# Patient Record
Sex: MALE | Race: WHITE | NOT HISPANIC OR LATINO | Employment: FULL TIME | URBAN - METROPOLITAN AREA
[De-identification: names, ages, dates, MRNs, and addresses within clinical notes are randomized per-mention and may not be internally consistent; named-entity substitution may affect disease eponyms.]

---

## 2017-08-21 ENCOUNTER — APPOINTMENT (OUTPATIENT)
Dept: GENERAL RADIOLOGY | Facility: HOSPITAL | Age: 27
End: 2017-08-21

## 2017-08-21 ENCOUNTER — HOSPITAL ENCOUNTER (EMERGENCY)
Facility: HOSPITAL | Age: 27
Discharge: HOME | End: 2017-08-21
Attending: EMERGENCY MEDICINE | Admitting: EMERGENCY MEDICINE

## 2017-08-21 VITALS
HEIGHT: 74 IN | BODY MASS INDEX: 28.23 KG/M2 | TEMPERATURE: 98.6 F | RESPIRATION RATE: 16 BRPM | HEART RATE: 81 BPM | WEIGHT: 220 LBS | SYSTOLIC BLOOD PRESSURE: 135 MMHG | DIASTOLIC BLOOD PRESSURE: 70 MMHG | OXYGEN SATURATION: 96 %

## 2017-08-21 DIAGNOSIS — R06.4 HYPERVENTILATION: ICD-10-CM

## 2017-08-21 DIAGNOSIS — J98.01 BRONCHOSPASM, ACUTE: ICD-10-CM

## 2017-08-21 DIAGNOSIS — R06.02 SHORTNESS OF BREATH: Primary | ICD-10-CM

## 2017-08-21 PROCEDURE — 99283 EMERGENCY DEPT VISIT LOW MDM: CPT

## 2017-08-21 PROCEDURE — 63710000001 PREDNISONE PER 1 MG: Performed by: EMERGENCY MEDICINE

## 2017-08-21 PROCEDURE — 93005 ELECTROCARDIOGRAM TRACING: CPT | Performed by: EMERGENCY MEDICINE

## 2017-08-21 PROCEDURE — 94640 AIRWAY INHALATION TREATMENT: CPT

## 2017-08-21 PROCEDURE — 71020 HC CHEST PA AND LATERAL: CPT

## 2017-08-21 PROCEDURE — 93010 ELECTROCARDIOGRAM REPORT: CPT | Performed by: INTERNAL MEDICINE

## 2017-08-21 RX ORDER — LORAZEPAM 1 MG/1
1 TABLET ORAL ONCE
Status: COMPLETED | OUTPATIENT
Start: 2017-08-21 | End: 2017-08-21

## 2017-08-21 RX ORDER — PREDNISONE 20 MG/1
60 TABLET ORAL DAILY
Qty: 9 TABLET | Refills: 0 | Status: SHIPPED | OUTPATIENT
Start: 2017-08-21

## 2017-08-21 RX ORDER — ALBUTEROL SULFATE 90 UG/1
2 AEROSOL, METERED RESPIRATORY (INHALATION) EVERY 6 HOURS PRN
Qty: 18 G | Refills: 0 | Status: SHIPPED | OUTPATIENT
Start: 2017-08-21

## 2017-08-21 RX ORDER — PREDNISONE 20 MG/1
60 TABLET ORAL ONCE
Status: COMPLETED | OUTPATIENT
Start: 2017-08-21 | End: 2017-08-21

## 2017-08-21 RX ORDER — IPRATROPIUM BROMIDE AND ALBUTEROL SULFATE 2.5; .5 MG/3ML; MG/3ML
3 SOLUTION RESPIRATORY (INHALATION) ONCE
Status: COMPLETED | OUTPATIENT
Start: 2017-08-21 | End: 2017-08-21

## 2017-08-21 RX ORDER — CELECOXIB 50 MG/1
50 CAPSULE ORAL 2 TIMES DAILY
COMMUNITY

## 2017-08-21 RX ORDER — SODIUM PHOSPHATE,MONO-DIBASIC 19G-7G/118
ENEMA (ML) RECTAL
COMMUNITY

## 2017-08-21 RX ADMIN — PREDNISONE 60 MG: 20 TABLET ORAL at 17:19

## 2017-08-21 RX ADMIN — IPRATROPIUM BROMIDE AND ALBUTEROL SULFATE 3 ML: .5; 3 SOLUTION RESPIRATORY (INHALATION) at 17:23

## 2017-08-21 RX ADMIN — LORAZEPAM 1 MG: 1 TABLET ORAL at 17:19

## 2017-08-21 NOTE — DISCHARGE INSTRUCTIONS
Take medications as prescribed.  Follow-up with your doctor next several days.  Return to emergency department for worsening shortness of breath, fever, dizziness, palpitations, or other concern.

## 2018-08-10 ENCOUNTER — OCCUPATIONAL MEDICINE (OUTPATIENT)
Dept: URGENT CARE | Facility: PHYSICIAN GROUP | Age: 28
End: 2018-08-10
Payer: COMMERCIAL

## 2018-08-10 DIAGNOSIS — S29.019A THORACIC MYOFASCIAL STRAIN, INITIAL ENCOUNTER: ICD-10-CM

## 2018-08-10 PROCEDURE — 99204 OFFICE O/P NEW MOD 45 MIN: CPT | Mod: 29 | Performed by: PHYSICIAN ASSISTANT

## 2018-08-10 RX ORDER — CYCLOBENZAPRINE HCL 10 MG
10 TABLET ORAL 3 TIMES DAILY PRN
Qty: 30 TAB | Refills: 0 | Status: SHIPPED | OUTPATIENT
Start: 2018-08-10 | End: 2022-11-07

## 2018-08-10 ASSESSMENT — ENCOUNTER SYMPTOMS
CHILLS: 0
MYALGIAS: 1
HEADACHES: 0
BACK PAIN: 1
DIZZINESS: 0
FEVER: 0
COUGH: 0
SHORTNESS OF BREATH: 0
TINGLING: 0
HEMOPTYSIS: 0
FOCAL WEAKNESS: 0
SENSORY CHANGE: 0
PALPITATIONS: 0

## 2018-08-10 NOTE — LETTER
[address] [place] [address]  Phone:  [phone] - Fax:  [phone]   [facility] Progress Report and Disability Certification  Date of Service: 8/10/2018   No Show:  No  Date / Time of Next Visit:   Discharged- not work related injury   Claim Information   Patient Name: Kendell Medina  Claim Number:     Employer: [employer]  Date of Injury: 8/9/2018     Insurer / TPA: [employer]  ID / SSN:     Occupation: maintenance teck  Diagnosis: The encounter diagnosis was Thoracic myofascial strain, initial encounter.    Medical Information   Related to Industrial Injury? No  Comments:no mechanism of injury to indicate a work-related injury    Subjective Complaints:  Date of onset of pain- 8/9/18. Patient reports he was walking while at work and felt a sharp pain in his left shoulder blade. The patient was not doing any heavy lifting or bending at the time. He was simply walking and felt pain. He states the pain was so intense he had to sit down. His pain has somewhat improved today. He does not feel this is a work related injury as he was not performing any actions when the pain occurred. He denies chest pain, shortness of breath, tingling, or numbness. He has no swelling. He denies difficulty with movement. Patient has taken OTC NSAIDS with significant relief.   Objective Findings: Vitals reviewed.  Heart: RRR. No Murmur, rub or gallop.  Lungs: CTA. No respiratory distress.   Left shoulder: NTTP. FROM. Distal n/v intact.  strength 5/5.  Thoracic spine: NTTP. Left paraspinal muscle TTP. Pain just medial to left shoulder blade.    Pre-Existing Condition(s): none   Assessment:   Initial Visit    Status: Discharged / Care Transfer  Permanent Disability:No    Plan: MedicationMedication (NOT at Work)  Comments:OTC NSAIDS, Flexeril (NOT at work). RICE, heat.    Diagnostics:      Comments:       Disability Information   Status: Released to  Full Duty    From:  8/10/2018  Through:   Restrictions are:     Physical Restrictions   Sitting:    Standing:    Stooping:    Bending:      Squatting:    Walking:    Climbing:    Pushing:      Pulling:    Other:    Reaching Above Shoulder (L):   Reaching Above Shoulder (R):       Reaching Below Shoulder (L):    Reaching Below Shoulder (R):      Not to exceed Weight Limits   Carrying(hrs):   Weight Limit(lb):   Lifting(hrs):   Weight  Limit(lb):     Comments: Follow-up with PCP if symptoms persist. Not a work related injury.    Repetitive Actions   Hands: i.e. Fine Manipulations from Grasping:     Feet: i.e. Operating Foot Controls:     Driving / Operate Machinery:     Physician Name: Obdulio Hester P.A.-C. Physician Signature: OBDULIO Linton P.A.-C. e-Signature: Dr. Rocael Guzman, Medical Director   Clinic Name / Location: [address] [address] Clinic Phone Number: Dept: [phone]   Appointment Time: 9:30 Am Visit Start Time:    Check-In Time:  9:34 Am Visit Discharge Time:  9:56am   Original-Treating Physician or Chiropractor    Page 2-Insurer/TPA    Page 3-Employer    Page 4-Employee

## 2018-08-10 NOTE — LETTER
August 10, 2018         Patient: Kendell Medina   YOB: 1990   Date of Visit: 8/10/2018           To Whom it May Concern:    Kendell Medina was seen in my clinic on 8/10/2018. He is able to return to work on 8/10/18.    If you have any questions or concerns, please don't hesitate to call.        Sincerely,           Edna Hester P.A.-C.  Electronically Signed

## 2018-08-10 NOTE — LETTER
EMPLOYEE’S CLAIM FOR COMPENSATION/ REPORT OF INITIAL TREATMENT  FORM C-4    EMPLOYEE’S CLAIM - PROVIDE ALL INFORMATION REQUESTED   First Name  Kendell Last Name  [place] Birthdate                    1990                Sex  male Claim Number   Home Address  [address] Age  28 y.o. 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 (if applicable)  walking    How did this injury or occupational disease occur? (Be specific an answer in detail. Use additional sheet if necessary)  Don't know   If you believe that you have an occupational disease, when did you first have knowledge of the disability and it relationship to your employment?  no Witnesses to the Accident  no      Nature of Injury or Occupational Disease  Workers' Compensation  Part(s) of Body Injured or Affected  Shoulder (L), ,     I certify that the above is true and correct to the best of my knowledge and that I have provided this information in order to obtain the benefits of [place] inclusive or Chapter [place] of [place]).  I hereby authorize any physician,  chiropractor, surgeon, practitioner, or other person, any hospital, including [facility] or [facility], any medical service organization, any insurance company, or other institution or organization to release to each other, any medical or other information, including benefits paid or payable, pertinent to this injury or disease, except information relative to diagnosis, treatment and/or counseling for AIDS, psychological conditions, alcohol or controlled substances, for which I must give specific authorization.  A Photostat of this authorization shall be as valid as the original.     Date 08/10/2018   [facility]   Employee’s Signature   THIS REPORT MUST BE COMPLETED AND MAILED WITHIN 3 WORKING DAYS OF TREATMENT   [facility]  Name of Facility  [place]   Date  8/10/2018 Diagnosis  (S29.019A) Thoracic myofascial strain, initial encounter Is there evidence the injured employee was under the influence of alcohol and/or another controlled substance at the time of accident?   Hour   Description of Injury or Disease  The encounter diagnosis was Thoracic myofascial strain, initial encounter. No   Treatment  OTC NSAIDS. Flexeril (not at work). Sedation side effects discussed. No Driving or alcohol with medication given.  Follow-up with PCP if symptoms persist.  Have you advised the patient to remain off work five days or more? No   X-Ray Findings      If Yes   From Date  To Date      From information given by the employee, together with medical evidence, can you directly connect this injury or occupational disease as job incurred?  No If No Full Duty  Yes Modified Duty      Is additional medical care by a physician indicated?  No If Modified Duty, Specify any Limitations / Restrictions      Do you know of any previous injury or disease contributing to this condition or occupational disease?                            No   Date  8/10/2018 Print Doctor’s Name Edna ZARATE  "LACEY Hester I certify the employer’s copy of  this form was mailed on:   Address  [address] Insurer’s Use Only     [facility] Zip  [address]    Provider’s Tax ID Number  [id number] Telephone  Dept: [phone]        jason-OBDULIO Galvez P.A.-C.   e-Signature: Dr. Rocael Guzman, Medical Director Degree  TAWANDA        ORIGINAL-TREATING PHYSICIAN OR CHIROPRACTOR    PAGE 2-INSURER/TPA    PAGE 3-EMPLOYER    PAGE 4-EMPLOYEE             Form C-4 (rev10/07)              BRIEF DESCRIPTION OF RIGHTS AND BENEFITS  (Pursuant to NRS 616C.050)    Notice of Injury or Occupational Disease (Incident Report Form C-1): If an injury or occupational disease (OD) arises out of and in the  course of employment, you must provide written notice to your employer as soon as practicable, but no later than 7 days after the accident or  OD. Your employer shall maintain a sufficient supply of the required forms.    Claim for Compensation (Form C-4): If medical treatment is sought, the form C-4 is available at the place of initial treatment. A completed  \"Claim for Compensation\" (Form C-4) must be filed within 90 days after an accident or OD. The treating physician or chiropractor must,  within 3 working days after treatment, complete and mail to the employer, the employer's insurer and third-party , the Claim for  Compensation.    Medical Treatment: If you require medical treatment for your on-the-job injury or OD, you may be required to select a physician or  chiropractor from a list provided by your workers’ compensation insurer, if it has contracted with an Organization for Managed Care (MCO) or  Preferred Provider Organization (PPO) or providers of health care. If your employer has not entered into a contract with an MCO or PPO, you  may select a physician or chiropractor from the Panel of Physicians and Chiropractors. Any medical costs related to your industrial injury or  OD will be paid by " your insurer.    Temporary Total Disability (TTD): If your doctor has certified that you are unable to work for a period of at least 5 consecutive days, or 5  cumulative days in a 20-day period, or places restrictions on you that your employer does not accommodate, you may be entitled to TTD  compensation.    Temporary Partial Disability (TPD): If the wage you receive upon reemployment is less than the compensation for TTD to which you are  entitled, the insurer may be required to pay you TPD compensation to make up the difference. TPD can only be paid for a maximum of 24  months.    Permanent Partial Disability (PPD): When your medical condition is stable and there is an indication of a PPD as a result of your injury or  OD, within 30 days, your insurer must arrange for an evaluation by a rating physician or chiropractor to determine the degree of your PPD. The  amount of your PPD award depends on the date of injury, the results of the PPD evaluation and your age and wage.    Permanent Total Disability (PTD): If you are medically certified by a treating physician or chiropractor as permanently and totally disabled  and have been granted a PTD status by your insurer, you are entitled to receive monthly benefits not to exceed 66 2/3% of your average  monthly wage. The amount of your PTD payments is subject to reduction if you previously received a PPD award.    Vocational Rehabilitation Services: You may be eligible for vocational rehabilitation services if you are unable to return to the job due to a  permanent physical impairment or permanent restrictions as a result of your injury or occupational disease.    Transportation and [place] Reimbursement: You may be eligible for travel expenses and [place] associated with medical treatment.    Reopening: You may be able to reopen your claim if your condition worsens after claim closure.    Appeal Process: If you disagree with a written determination issued by the  insurer or the insurer does not respond to your request, you may  appeal to the Department of Administration, , by following the instructions contained in your determination letter. You must  appeal the determination within 70 days from the date of the determination letter at [address], [address], [place], [place]  [address], or [address], [address], [place], [place] [address]. If you disagree with the  decision, you may appeal to the  Department of Administration, . You must file your appeal within 30 days from the date of the  decision  letter at [address], [address], [place], [place] [address], or [address], [place], [place] [address]. If you  disagree with a decision of an , you may file a petition for judicial review with the District Court. You must do so within 30  days of the Appeal Officer’s decision. You may be represented by an  at your own expense or you may contact the [facility] for possible  representation.    [place]  for Injured Workers (NAIW): If you disagree with a  decision, you may request that NAIW represent you  without charge at an  Hearing. For information regarding denial of benefits, you may contact the [facility] at: [address], [address], [place] [address], [phone], or [address], [place] [address], [phone]    To File a Complaint with the Division: If you wish to file a complaint with the  of the Division of Industrial Relations (DIR),  please contact the Workers’ Compensation Section, [address], [address], [place], [place] [address], telephone [phone]) [phone], or  [address], [place] [address], telephone [phone].    For assistance with Workers’ Compensation Issues: you may contact the Office of the Governor  Consumer Health Assistance, [address], [address], [place], [place] [address], Toll Free 1-911.579.1734, Web site: http://govcha.[facility].nv., E-mail  [email or web address].[email or web address].                                                                                                                                                                                                                                   __________________________________________________________________                                                                   __08/10/2018_______________                Employee Name / Signature                                                                                                                                                       Date                                                                                                                                                                                                     D-2 (rev. 10/07)

## 2018-08-10 NOTE — PROGRESS NOTES
Subjective:      Kendell Medina is a 28 y.o. male who presents with No chief complaint on file.      Date of onset of pain- 8/9/18. Patient reports he was walking while at work and felt a sharp pain in his left shoulder blade. The patient was not doing any heavy lifting or bending at the time. He was simply walking and felt pain. He states the pain was so intense he had to sit down. His pain has somewhat improved today. He does not feel this is a work related injury as he was not performing any actions when the pain occurred. He denies chest pain, shortness of breath, tingling, or numbness. He has no swelling. He denies difficulty with movement. Patient has taken OTC NSAIDS with significant relief.     HPI    No past medical history on file.    No past surgical history on file.    No family history on file.    Allergies   Allergen Reactions   • Penicillins Hives   • Sulfa Drugs Hives       Medications, Allergies, and current problem list reviewed today in Epic    Review of Systems   Constitutional: Negative for chills, fever and malaise/fatigue.   Respiratory: Negative for cough, hemoptysis and shortness of breath.    Cardiovascular: Negative for chest pain, palpitations and leg swelling.   Musculoskeletal: Positive for back pain and myalgias.   Skin: Negative for rash.   Neurological: Negative for dizziness, tingling, sensory change, focal weakness and headaches.     All other systems reviewed and are negative.          Objective:     There were no vitals taken for this visit.     Physical Exam   Constitutional: He is oriented to person, place, and time. He appears well-developed and well-nourished. No distress.   Neurological: He is alert and oriented to person, place, and time.   Skin: Skin is warm and dry.   Psychiatric: He has a normal mood and affect. His behavior is normal. Judgment and thought content normal.       Vitals reviewed.  Heart: RRR. No Murmur, rub or gallop.  Lungs: CTA. No respiratory  distress.   Left shoulder: NTTP. FROM. Distal n/v intact.  strength 5/5.  Thoracic spine: NTTP. Left paraspinal muscle TTP. Pain just medial to left shoulder blade.        Assessment/Plan:     1. Thoracic myofascial strain, initial encounter    - Not work related  - OTC NSAIDS  - cyclobenzaprine (FLEXERIL) 10 MG Tab; Take 1 Tab by mouth 3 times a day as needed.  Dispense: 30 Tab; Refill: 0  Sedation side effects discussed. No Driving or alcohol with medication given.    Follow-up with PCP or Urgent Care if symptoms persist.    Work note given for patient to return to work tonight.     Differential diagnoses, Supportive care, and indications for immediate follow-up discussed with patient.   Instructed to return to clinic or nearest emergency department for any change in condition, further concerns, or worsening of symptoms.    The patient demonstrated a good understanding and agreed with the treatment plan.    Edna Hester P.A.-C.

## 2021-07-14 ENCOUNTER — NON-PROVIDER VISIT (OUTPATIENT)
Dept: URGENT CARE | Facility: PHYSICIAN GROUP | Age: 31
End: 2021-07-14

## 2021-07-14 DIAGNOSIS — Z02.1 PRE-EMPLOYMENT DRUG SCREENING: ICD-10-CM

## 2021-07-14 LAB
AMP AMPHETAMINE: NORMAL
COC COCAINE: NORMAL
INT CON NEG: NORMAL
INT CON POS: NORMAL
MET METHAMPHETAMINES: NORMAL
OPI OPIATES: NORMAL
PCP PHENCYCLIDINE: NORMAL
POC DRUG COMMENT 753798-OCCUPATIONAL HEALTH: NEGATIVE
THC: NORMAL

## 2021-07-14 PROCEDURE — 80305 DRUG TEST PRSMV DIR OPT OBS: CPT | Performed by: PHYSICIAN ASSISTANT

## 2022-11-07 ENCOUNTER — OFFICE VISIT (OUTPATIENT)
Dept: URGENT CARE | Facility: PHYSICIAN GROUP | Age: 32
End: 2022-11-07
Payer: COMMERCIAL

## 2022-11-07 VITALS
DIASTOLIC BLOOD PRESSURE: 68 MMHG | WEIGHT: 150 LBS | HEIGHT: 73 IN | RESPIRATION RATE: 16 BRPM | OXYGEN SATURATION: 100 % | SYSTOLIC BLOOD PRESSURE: 138 MMHG | TEMPERATURE: 96.9 F | HEART RATE: 79 BPM | BODY MASS INDEX: 19.88 KG/M2

## 2022-11-07 DIAGNOSIS — R40.20 LOSS OF CONSCIOUSNESS (HCC): ICD-10-CM

## 2022-11-07 DIAGNOSIS — R56.9 SEIZURE (HCC): ICD-10-CM

## 2022-11-07 PROCEDURE — 99205 OFFICE O/P NEW HI 60 MIN: CPT

## 2022-11-07 ASSESSMENT — ENCOUNTER SYMPTOMS
WEAKNESS: 1
DIAPHORESIS: 1

## 2022-11-07 NOTE — PROGRESS NOTES
"Subjective     Kendell Medina is a 32 y.o. male who presents with Seizure (/20-30 minutes ago, did not want  to call ambulance. Stopped breathing. )            Seizure  This is a new problem. The current episode started today. The problem has been resolved. Associated symptoms include diaphoresis and weakness. Nothing aggravates the symptoms. He has tried nothing for the symptoms.     Patient presents with symptoms that started 40 minutes prior to consult.  His wife reports that she was driving when the patient suddenly had an episode.  She reports the episode lasted for about 10 minutes, and the \"seizure\" for 3 minutes.  The patient states that he started feeling tingling sensation from his toes and fingers that eventually involved his whole body.  He experienced diaphoresis, states he felt sweaty.  His wife states that she had to stop on the side of the freeway when he started \"passing out\".  She reports that he lost consciousness for about 10 minutes.  She notes that when he was seizing, his eyes were open.  She further notes his breathing to be shallow at that point.  She states trying to call 911 through [employer], but was unsuccessful.  Patient denies any previous history of seizures.  He denies any history of head trauma.  He denies drinking alcohol.  He reports previous history of heavy drinking for 4 years, but states that he has not had alcohol in the past 2 months.  He denies any history of DTs.  He also reports previous drug use, but denies any current utilization.    Patient's current problem list, medications, and past medical/surgical history were reviewed in Epic.    PMH:  has no past medical history on file.  MEDS:   Current Outpatient Medications:     ibuprofen (MOTRIN) 200 MG Tab, Take 1 Tablet by mouth every 6 hours as needed., Disp: , Rfl:   ALLERGIES:   Allergies   Allergen Reactions    Penicillins Hives    Sulfa Drugs Hives     SURGHX: No past surgical history on file.  SOCHX:  reports " "that he has been smoking cigarettes. He has been smoking an average of 1 pack per day. He has never used smokeless tobacco. He reports that he does not drink alcohol and does not use drugs.  FH: Reviewed with patient, not pertinent to this visit.        Review of Systems   Constitutional:  Positive for diaphoresis.   Neurological:  Positive for weakness.            Objective     /68   Pulse 79   Temp 36.1 °C (96.9 °F) (Temporal)   Resp 16   Ht 1.854 m (6' 1\")   Wt 68 kg (150 lb)   SpO2 100%   BMI 19.79 kg/m²      Physical Exam  Constitutional:       Appearance: Normal appearance.   HENT:      Head: Normocephalic.      Nose: Nose normal.   Eyes:      Extraocular Movements: Extraocular movements intact.   Cardiovascular:      Rate and Rhythm: Normal rate and regular rhythm.      Pulses: Normal pulses.      Heart sounds: Normal heart sounds.   Pulmonary:      Effort: Pulmonary effort is normal.      Breath sounds: Normal breath sounds.   Musculoskeletal:         General: Normal range of motion.      Cervical back: Normal range of motion.   Skin:     General: Skin is warm.   Neurological:      General: No focal deficit present.      Mental Status: He is alert and oriented to person, place, and time.      Cranial Nerves: No dysarthria.      Sensory: Sensation is intact.      Motor: Motor function is intact.      Comments: Unable to complete gait assessment as patient is weak from the event and high risk for fall    Psychiatric:         Mood and Affect: Mood normal.         Behavior: Behavior normal.                   Assessment & Plan        1. Seizure (HCC)      2. Loss of consciousness (HCC)    Patient's physical exam is unremarkable.  He appeared weak and exhausted, but otherwise vitals are unremarkable.  His neurologic exam is mostly unremarkable, but were unable to assess his gait as he is unsteady and weak.  He is advised to go to the emergency room for further evaluation and management.  Offered to " call [phone] patient can be transported via ambulance, but but wife refused this and states that she will drive her to the ER via private vehicle.  Advised her that if patient started seizing again, to stop the car and call 911.  Discussed treatment plan with patient, he is agreeable and verbalized understanding.  Educated patient on signs and symptoms watch out for, when to return to the clinic or go to the ER.    Electronically Signed by RENNY Wilson

## 2023-07-18 ENCOUNTER — OFFICE VISIT (OUTPATIENT)
Dept: URGENT CARE | Facility: PHYSICIAN GROUP | Age: 33
End: 2023-07-18
Payer: COMMERCIAL

## 2023-07-18 VITALS
OXYGEN SATURATION: 98 % | SYSTOLIC BLOOD PRESSURE: 132 MMHG | WEIGHT: 189 LBS | TEMPERATURE: 98.4 F | HEIGHT: 73 IN | DIASTOLIC BLOOD PRESSURE: 72 MMHG | RESPIRATION RATE: 16 BRPM | BODY MASS INDEX: 25.05 KG/M2 | HEART RATE: 85 BPM

## 2023-07-18 DIAGNOSIS — K52.9 GASTROENTERITIS: ICD-10-CM

## 2023-07-18 PROCEDURE — 3078F DIAST BP <80 MM HG: CPT | Performed by: FAMILY MEDICINE

## 2023-07-18 PROCEDURE — 99213 OFFICE O/P EST LOW 20 MIN: CPT | Performed by: FAMILY MEDICINE

## 2023-07-18 PROCEDURE — 3075F SYST BP GE 130 - 139MM HG: CPT | Performed by: FAMILY MEDICINE

## 2023-07-18 NOTE — PROGRESS NOTES
"  Subjective:      33 y.o. male presents to urgent care for GI issues that started yesterday.  He woke up and had sudden onset vomiting and diarrhea.  There was no blood in either vomit or stool.  He did have associated abdominal pain.  Today he has had no vomiting, diarrhea, or abdominal pain.  No changes to urinary urgency, frequency, dysuria, or hematuria.  No recent travel, antibiotic use, change in diet, or exposure to exotic animals.  No prior history of abdominal surgery.    He denies any other questions or concerns at this time.    Current problem list, medication, and past medical/surgical history were reviewed in Epic.    ROS  See HPI     Objective:      /72   Pulse 85   Temp 36.9 °C (98.4 °F) (Temporal)   Resp 16   Ht 1.854 m (6' 1\")   Wt 85.7 kg (189 lb)   SpO2 98%   BMI 24.94 kg/m²     Physical Exam  Constitutional:       General: He is not in acute distress.     Appearance: He is not diaphoretic.   Cardiovascular:      Rate and Rhythm: Normal rate and regular rhythm.      Heart sounds: Normal heart sounds.   Pulmonary:      Effort: Pulmonary effort is normal. No respiratory distress.      Breath sounds: Normal breath sounds.   Abdominal:      General: Bowel sounds are normal.      Palpations: Abdomen is soft.      Tenderness: There is no abdominal tenderness. There is no right CVA tenderness or left CVA tenderness.   Neurological:      Mental Status: He is alert.   Psychiatric:         Mood and Affect: Affect normal.         Judgment: Judgment normal.       Assessment/Plan:     1. Gastroenteritis  No red flag warning signs.  Symptoms are already improving.  Most consistent with virus.  He is encouraged to stay well-hydrated.      Instructed to return to Urgent Care or nearest Emergency Department if symptoms fail to improve, for any change in condition, further concerns, or new concerning symptoms. Patient states understanding of the plan of care and discharge instructions.    Tasha LARKIN" GAUTAM Parker.

## 2023-07-18 NOTE — LETTER
July 18, 2023    To Whom It May Concern:         This is confirmation that Kendell Carol attended his scheduled appointment with Tasha Parker M.D. on 7/18/23. He may return to work tomorrow without any restrictions.          If you have any questions please do not hesitate to call me at the phone number listed below.    Sincerely,          Tasha Parker M.D.  [phone]

## 2024-06-09 ENCOUNTER — OFFICE VISIT (OUTPATIENT)
Dept: URGENT CARE | Facility: PHYSICIAN GROUP | Age: 34
End: 2024-06-09
Payer: COMMERCIAL

## 2024-06-09 VITALS
SYSTOLIC BLOOD PRESSURE: 108 MMHG | TEMPERATURE: 98.1 F | OXYGEN SATURATION: 99 % | BODY MASS INDEX: 25.02 KG/M2 | DIASTOLIC BLOOD PRESSURE: 64 MMHG | HEIGHT: 73 IN | WEIGHT: 188.8 LBS | HEART RATE: 67 BPM | RESPIRATION RATE: 16 BRPM

## 2024-06-09 DIAGNOSIS — M62.830 MUSCLE SPASM OF BACK: ICD-10-CM

## 2024-06-09 PROCEDURE — 3078F DIAST BP <80 MM HG: CPT | Performed by: FAMILY MEDICINE

## 2024-06-09 PROCEDURE — 99213 OFFICE O/P EST LOW 20 MIN: CPT | Performed by: FAMILY MEDICINE

## 2024-06-09 PROCEDURE — 3074F SYST BP LT 130 MM HG: CPT | Performed by: FAMILY MEDICINE

## 2024-06-09 RX ORDER — SERTRALINE HYDROCHLORIDE 25 MG/1
TABLET, FILM COATED ORAL
COMMUNITY
Start: 2024-05-22 | End: 2025-05-23

## 2024-06-09 RX ORDER — CYCLOBENZAPRINE HCL 10 MG
TABLET ORAL
Qty: 30 TABLET | Refills: 0 | Status: SHIPPED | OUTPATIENT
Start: 2024-06-09

## 2024-06-09 RX ORDER — NICOTINE 21 MG/24HR
PATCH, TRANSDERMAL 24 HOURS TRANSDERMAL
COMMUNITY
Start: 2024-05-23 | End: 2024-06-22

## 2024-06-09 NOTE — PROGRESS NOTES
Chief Complaint:    Chief Complaint   Patient presents with    Low Back Pain     X this am with spasms radiating to L side. Hx of Chronic back pain.        History of Present Illness:    Was driving to work today and left mid-lower back started with spasms. He has similar occurrences, about once a year for many years. Helpful for previous episodes has been Flexeril and a day off work to rest. Does not need anything else for this. No recent injury or trauma. No radiating pain down legs. No loss of bowel/bladder control. No saddle anesthesia.      Past Medical History:    No past medical history on file.    Past Surgical History:    No past surgical history on file.    Social History:    Social History     Socioeconomic History    Marital status:      Spouse name: Not on file    Number of children: Not on file    Years of education: Not on file    Highest education level: Not on file   Occupational History    Not on file   Tobacco Use    Smoking status: Every Day     Current packs/day: 1.00     Types: Cigarettes    Smokeless tobacco: Never   Substance and Sexual Activity    Alcohol use: No    Drug use: No    Sexual activity: Not on file   Other Topics Concern    Not on file   Social History Narrative    Not on file     Social Determinants of Health     Financial Resource Strain: Not on file   Food Insecurity: Not on file   Transportation Needs: Not on file   Physical Activity: Not on file   Stress: Not on file   Social Connections: Not on file   Intimate Partner Violence: Not on file   Housing Stability: Not on file     Family History:    No family history on file.    Medications:    Current Outpatient Medications on File Prior to Visit   Medication Sig Dispense Refill    sertraline (ZOLOFT) 25 MG tablet TAKE ONE TABLET BY MOUTH ONCE A DAY FOR DEPRESSION **MEDICATION GUIDE**      nicotine (NICODERM) 21 MG/24HR PATCH 24 HR APPLY 1 PATCH TO SKIN ONCE A DAY TO ASSIST WITH SMOKING CESSATION. STOP TOBACCO PRODUCTS  "UPON STARTING THIS MEDICATION REMOVE OLD PATCH PRIOR TO APPLYING NEW PATCH      nicotine polacrilex (NICORETTE) 4 MG gum CHEW 1 PIECE BY MOUTH EVERY ONE TO TWO HOURS AS NEEDED TO ASSIST WITH SMOKING CESSATION. STOP TOBACCO PRODUCTS UPON STARTING THIS MEDICATION. MAXIMUM 24 PIECES/DAY      ibuprofen (MOTRIN) 200 MG Tab Take 1 Tablet by mouth every 6 hours as needed.       No current facility-administered medications on file prior to visit.     Allergies:    Allergies   Allergen Reactions    Penicillins Hives    Sulfa Drugs Hives       Vitals:    Vitals:    24 1200   BP: 108/64   Pulse: 67   Resp: 16   Temp: 36.7 °C (98.1 °F)   TempSrc: Temporal   SpO2: 99%   Weight: 85.6 kg (188 lb 12.8 oz)   Height: 1.854 m (6' 1\")       Physical Exam:    Constitutional: Vital signs reviewed. Appears well-developed and well-nourished. Has pain due to back.   Eyes: Sclera white, conjunctivae clear.  ENT: External ears normal. Hearing normal.  Pulmonary/Chest: Respirations non-labored.  Musculoskeletal: Left mid and lower back paraspinal muscles are tender to palpation and tighter compared to right side.  Neurological: Alert and oriented to person, place, and time. Muscle tone normal. Coordination normal. Light touch and sensation normal.   Skin: No rashes or lesions. Warm, dry, normal turgor.  Psychiatric: Normal mood and affect. Behavior is normal. Judgment and thought content normal.       Assessment / Plan & Medical Decision Makin. Muscle spasm of back  - cyclobenzaprine (FLEXERIL) 10 mg Tab; 1 TAB BY MOUTH EVERY 8 HOURS ONLY IF NEEDED FOR PAIN, MUSCLE SPASM, AND/OR MUSCLE TIGHTNESS. MAY CAUSE DROWSINESS.  Dispense: 30 Tablet; Refill: 0       Work note given - excuse for 24.    Discussed with him DDX, management options, and risks, benefits, and alternatives to treatment plan agreed upon.    Was driving to work today and left mid-lower back started with spasms. He has similar occurrences, about once a year for " many years. Helpful for previous episodes has been Flexeril and a day off work to rest. Does not need anything else for this. No recent injury or trauma. No radiating pain down legs. No loss of bowel/bladder control. No saddle anesthesia.    Has pain due to back. Left mid and lower back paraspinal muscles are tender to palpation and tighter compared to right side.    Agreeable to medication prescribed.    Discussed expected course of duration, time for improvement, and to seek follow-up in Emergency Room, urgent care, or with PCP if getting worse at any time or not improving within expected time frame.

## 2024-06-09 NOTE — LETTER
June 9, 2024         Patient: Kendell Medina   YOB: 1990   Date of Visit: 6/9/2024           To Whom it May Concern:    Kendell Medina was seen in my clinic on 6/9/2024.     Please excuse from work for 6/9/24 due to medical condition.     If you have any questions or concerns, please don't hesitate to call.        Sincerely,           Guerrero Fong M.D.  Electronically Signed

## 2024-06-10 ENCOUNTER — OFFICE VISIT (OUTPATIENT)
Dept: URGENT CARE | Facility: PHYSICIAN GROUP | Age: 34
End: 2024-06-10
Payer: COMMERCIAL

## 2024-06-10 VITALS
RESPIRATION RATE: 16 BRPM | WEIGHT: 183 LBS | TEMPERATURE: 98.5 F | DIASTOLIC BLOOD PRESSURE: 74 MMHG | OXYGEN SATURATION: 98 % | HEIGHT: 73 IN | BODY MASS INDEX: 24.25 KG/M2 | HEART RATE: 88 BPM | SYSTOLIC BLOOD PRESSURE: 110 MMHG

## 2024-06-10 DIAGNOSIS — S39.012D ACUTE MYOFASCIAL STRAIN OF LUMBAR REGION, SUBSEQUENT ENCOUNTER: ICD-10-CM

## 2024-06-10 PROCEDURE — 99212 OFFICE O/P EST SF 10 MIN: CPT

## 2024-06-10 PROCEDURE — 3078F DIAST BP <80 MM HG: CPT

## 2024-06-10 PROCEDURE — 3074F SYST BP LT 130 MM HG: CPT

## 2024-06-10 ASSESSMENT — ENCOUNTER SYMPTOMS: BACK PAIN: 1

## 2024-06-10 NOTE — PROGRESS NOTES
Subjective:   Kendell Medina is a very pleasant 34 y.o. male who presents for:    Chief Complaint   Patient presents with    Low Back Pain     With spasms.  Pt. Was seen yesterday and needs a note to stay off work today.        HPI:    The patient reports that he was seen in the urgent care yesterday for the evaluation of a lumbar strain.  He is supposed to work today and tomorrow and is requesting a work note.  Reports that he is engaging in supportive measures as well as taking cyclobenzaprine as needed.  No complaints of lower extremity weakness, loss of bladder or bowel, worsening pain.    ROS:    Review of Systems   Musculoskeletal:  Positive for back pain.   All other systems reviewed and are negative.      Medications:      Current Outpatient Medications   Medication Sig    sertraline (ZOLOFT) 25 MG tablet TAKE ONE TABLET BY MOUTH ONCE A DAY FOR DEPRESSION **MEDICATION GUIDE**    nicotine (NICODERM) 21 MG/24HR PATCH 24 HR APPLY 1 PATCH TO SKIN ONCE A DAY TO ASSIST WITH SMOKING CESSATION. STOP TOBACCO PRODUCTS UPON STARTING THIS MEDICATION REMOVE OLD PATCH PRIOR TO APPLYING NEW PATCH    nicotine polacrilex (NICORETTE) 4 MG gum CHEW 1 PIECE BY MOUTH EVERY ONE TO TWO HOURS AS NEEDED TO ASSIST WITH SMOKING CESSATION. STOP TOBACCO PRODUCTS UPON STARTING THIS MEDICATION. MAXIMUM 24 PIECES/DAY    cyclobenzaprine (FLEXERIL) 10 mg Tab 1 TAB BY MOUTH EVERY 8 HOURS ONLY IF NEEDED FOR PAIN, MUSCLE SPASM, AND/OR MUSCLE TIGHTNESS. MAY CAUSE DROWSINESS.    ibuprofen (MOTRIN) 200 MG Tab Take 1 Tablet by mouth every 6 hours as needed.       Allergies:     Allergies   Allergen Reactions    Penicillins Hives    Sulfa Drugs Hives       Problem List:     There is no problem list on file for this patient.      Surgical History:    No past surgical history on file.    Past Social Hx:     Social History     Socioeconomic History    Marital status:    Tobacco Use    Smoking status: Every Day     Current packs/day: 1.00      Types: Cigarettes    Smokeless tobacco: Never   Substance and Sexual Activity    Alcohol use: No    Drug use: No        Past Family Hx:      History reviewed. No pertinent family history.    Problem list, medications, and allergies reviewed by myself today in Epic.     Objective:     Vitals:    06/10/24 0948   BP: 110/74   Pulse: 88   Resp: 16   Temp: 36.9 °C (98.5 °F)   SpO2: 98%       Physical Exam  Vitals reviewed.   Constitutional:       General: He is not in acute distress.     Appearance: He is normal weight. He is not ill-appearing or toxic-appearing.   Musculoskeletal:      Lumbar back: Spasms and tenderness present.        Back:    Neurological:      Mental Status: He is alert.                 Assessment/Plan:     Diagnosis and associated orders:     1. Acute myofascial strain of lumbar region, subsequent encounter          Comments/MDM:     Work note provided for patient  Avoid bending, stooping, heavy or repetitive lifting until symptoms resolve.  Avoid prolonged sitting; frequent changes of position may be helpful.  Begin gentle stretching before activity when tolerated.   OTC NSAIDs for pain/discomfort  Alternate ice and heat to the affected area for symptomatic relief  Home stretches as discussed in clinic  Follow-up with PCP  Return to clinic or go to the ED if symptoms worsen or fail to improve, or if the patient should develop worsening/increasing back pain, radiation of pain, numbness, tingling, or weakness to the lower extremities, incontinence of bladder/bowel, paresthesias, difficulty walking, fever/chills, and/or any concerning symptoms.    Return to clinic as needed         All questions answered. Patient verbalized understanding and is in agreement with this plan of care.     If symptoms are worsening or not improving in 3-5 days, follow-up with PCP or return to . Differential diagnosis, natural history, and supportive care discussed. AVS handout given and reviewed with patient. Patient  educated on red flags and when to seek treatment back in ED or UC.     I personally reviewed prior external notes and test results pertinent to today's visit.  I have independently reviewed and interpreted all diagnostics ordered during this urgent care visit.     This dictation has been created using voice recognition software. The accuracy of the dictation is limited by the abilities of the software. I expect there may be some errors of grammar and possibly content. I made every attempt to manually correct the errors within my dictation. However, errors related to voice recognition software may still exist and should be interpreted within the appropriate context.    This note was electronically signed by WILLIS Alberto

## 2024-06-10 NOTE — LETTER
Funmi 10, 2024    To Whom It May Concern:         This is confirmation that Kendell Coronadford attended his scheduled appointment with WILLIS Solano on 6/10/24. Please excuse him from work from 6/10/24 to 6/11/24 due to an acute condition.         If you have any questions please do not hesitate to call me at the phone number listed below.    Sincerely,          Anabelle Jin A.P.R.N.  [phone]

## 2024-06-16 ENCOUNTER — OFFICE VISIT (OUTPATIENT)
Dept: URGENT CARE | Facility: PHYSICIAN GROUP | Age: 34
End: 2024-06-16
Payer: COMMERCIAL

## 2024-06-16 VITALS
HEART RATE: 66 BPM | OXYGEN SATURATION: 98 % | TEMPERATURE: 98.4 F | RESPIRATION RATE: 16 BRPM | WEIGHT: 180.8 LBS | SYSTOLIC BLOOD PRESSURE: 118 MMHG | HEIGHT: 74 IN | BODY MASS INDEX: 23.2 KG/M2 | DIASTOLIC BLOOD PRESSURE: 80 MMHG

## 2024-06-16 DIAGNOSIS — M54.50 LUMBAR PAIN: ICD-10-CM

## 2024-06-16 PROCEDURE — 99213 OFFICE O/P EST LOW 20 MIN: CPT | Performed by: FAMILY MEDICINE

## 2024-06-16 PROCEDURE — 3074F SYST BP LT 130 MM HG: CPT | Performed by: FAMILY MEDICINE

## 2024-06-16 PROCEDURE — 3079F DIAST BP 80-89 MM HG: CPT | Performed by: FAMILY MEDICINE

## 2024-06-16 RX ORDER — PREDNISONE 20 MG/1
TABLET ORAL
Qty: 11 TABLET | Refills: 0 | Status: SHIPPED | OUTPATIENT
Start: 2024-06-16

## 2024-06-16 ASSESSMENT — ENCOUNTER SYMPTOMS
SENSORY CHANGE: 0
BACK PAIN: 1
FEVER: 0
TINGLING: 0

## 2024-06-16 NOTE — PROGRESS NOTES
Subjective:     Kendell Medina is a 34 y.o. male who presents for Back Pain (Pt states he is still having extreme back pain. He woke up this morning stiff and by the time he got to work he couldn't stay. 7/10 pain currently.)      HPI  Pt presents for repeat evaluation  Patient with acute exacerbation of chronic back pain which started while driving 6/9/2024  He was initially evaluated same day that back pain started in urgent care and started on cyclobenzaprine  Was seen again in the urgent care the next day on 6/10/2024 and was again reviewed supportive care measures and given a note for work  Pain is mostly left lower   No radiation down the leg   No numbness or tingling     Review of Systems   Constitutional:  Negative for fever.   Musculoskeletal:  Positive for back pain.   Neurological:  Negative for tingling and sensory change.     PMH:  has no past medical history on file.  MEDS:   Current Outpatient Medications:     predniSONE (DELTASONE) 20 MG Tab, Take 2 tabs (40mg) daily x 3 days, then take 1 tab (20mg) daily x 3 days, then take 1/2 tab (10mg) daily x 4 days, Disp: 11 Tablet, Rfl: 0    sertraline (ZOLOFT) 25 MG tablet, TAKE ONE TABLET BY MOUTH ONCE A DAY FOR DEPRESSION **MEDICATION GUIDE**, Disp: , Rfl:     nicotine (NICODERM) 21 MG/24HR PATCH 24 HR, APPLY 1 PATCH TO SKIN ONCE A DAY TO ASSIST WITH SMOKING CESSATION. STOP TOBACCO PRODUCTS UPON STARTING THIS MEDICATION REMOVE OLD PATCH PRIOR TO APPLYING NEW PATCH, Disp: , Rfl:     nicotine polacrilex (NICORETTE) 4 MG gum, CHEW 1 PIECE BY MOUTH EVERY ONE TO TWO HOURS AS NEEDED TO ASSIST WITH SMOKING CESSATION. STOP TOBACCO PRODUCTS UPON STARTING THIS MEDICATION. MAXIMUM 24 PIECES/DAY, Disp: , Rfl:     cyclobenzaprine (FLEXERIL) 10 mg Tab, 1 TAB BY MOUTH EVERY 8 HOURS ONLY IF NEEDED FOR PAIN, MUSCLE SPASM, AND/OR MUSCLE TIGHTNESS. MAY CAUSE DROWSINESS., Disp: 30 Tablet, Rfl: 0  ALLERGIES:   Allergies   Allergen Reactions    Penicillins Hives    Sulfa Drugs  "Hives     SURGHX: No past surgical history on file.  SOCHX:  reports that he has been smoking cigarettes. He has never used smokeless tobacco. He reports that he does not drink alcohol and does not use drugs.     Objective:   /80 (BP Location: Left arm, Patient Position: Sitting, BP Cuff Size: Adult)   Pulse 66   Temp 36.9 °C (98.4 °F) (Temporal)   Resp 16   Ht 1.88 m (6' 2\")   Wt 82 kg (180 lb 12.8 oz)   SpO2 98%   BMI 23.21 kg/m²     Physical Exam  Constitutional:       General: He is not in acute distress.     Appearance: He is well-developed. He is not diaphoretic.   Pulmonary:      Effort: Pulmonary effort is normal.   Neurological:      Mental Status: He is alert.     Back:  General: No asymmetry, bruising, or erythema appreciated  ROM: flexion 80°, extension 0°, lateral bend 20°, lateral twist 20°  Palpation: No tenderness to palpation of spinous processes, no step-offs appreciated, +TTP and tightness of the left lumbar paraspinal muscles with some mild tenderness in the right lumbar paraspinal muscles.  No significant SI joint tenderness or buttock tenderness    Assessment/Plan:   Assessment    1. Lumbar pain  - predniSONE (DELTASONE) 20 MG Tab; Take 2 tabs (40mg) daily x 3 days, then take 1 tab (20mg) daily x 3 days, then take 1/2 tab (10mg) daily x 4 days  Dispense: 11 Tablet; Refill: 0    Patient with acute on chronic low back pain.  Did not have any major fall or injury which started this particular episode.  Advised that imaging is not needed today, but could be considered due to his lack of resolution.  [facility] he does most of his long-term care.  [facility] his [facility] and would recommend allowing the [facility] to [facility] since [facility] is [facility] his [facility] care is.  Patient agreeable to plan and will defer imaging today.  Recommended heat, light stretching, activity modification, and " continued light exercise.  Note for work given.  Reviewed risks and  benefits of trial of course of steroids and patient wishing to try the medication.  [facility].

## 2024-06-16 NOTE — LETTER
June 16, 2024    To Whom It May Concern:         This is confirmation that Kendell Medina attended his scheduled appointment with Rodger Jimenez M.D. on 6/16/24.  He is recovering from an acute injury.  He will need restrictions for the next 1 week (until 6/24/24).  He cannot lift over 20 lbs, no climbing ladders, and no twisting with the back.  Please limit bending at the waist to no more than 2 hours per day.  Thank you for making accommodations as he recovers.          If you have any questions please do not hesitate to call me at the phone number listed below.    Sincerely,          Rodger Jimenez M.D.  [phone]

## 2024-06-24 ENCOUNTER — OFFICE VISIT (OUTPATIENT)
Dept: URGENT CARE | Facility: PHYSICIAN GROUP | Age: 34
End: 2024-06-24
Payer: COMMERCIAL

## 2024-06-24 VITALS
DIASTOLIC BLOOD PRESSURE: 72 MMHG | BODY MASS INDEX: 23.33 KG/M2 | HEART RATE: 98 BPM | OXYGEN SATURATION: 95 % | RESPIRATION RATE: 16 BRPM | TEMPERATURE: 98.5 F | HEIGHT: 73 IN | WEIGHT: 176 LBS | SYSTOLIC BLOOD PRESSURE: 124 MMHG

## 2024-06-24 DIAGNOSIS — M54.50 LUMBAR PAIN: ICD-10-CM

## 2024-06-24 DIAGNOSIS — M62.830 MUSCLE SPASM OF BACK: ICD-10-CM

## 2024-06-24 PROCEDURE — 3074F SYST BP LT 130 MM HG: CPT

## 2024-06-24 PROCEDURE — 99214 OFFICE O/P EST MOD 30 MIN: CPT

## 2024-06-24 PROCEDURE — 3078F DIAST BP <80 MM HG: CPT

## 2024-06-24 ASSESSMENT — ENCOUNTER SYMPTOMS
FEVER: 0
BACK PAIN: 1
CHILLS: 0

## 2024-06-24 NOTE — PROGRESS NOTES
"  CHIEF COMPLAINT  Chief Complaint   Patient presents with    Low Back Pain     X this morning after cleaning his yard.      Subjective:   Kendell Medina is a 34 y.o. male who presents to urgent care with complaints of pain x 1 day.  Patient reports symptoms began yesterday evening after cleaning his yard.  He reports  chronic issues with back pain, and has been seen in urgent care intermittently for this condition.  He reports symptoms have improved with dose of Flexeril that he took yesterday evening. He denies any fevers chills.  He denies any radiation of pain.  No numbness or tingling. [facility]. No other pertinent past medical history.     Review of Systems   Constitutional:  Negative for chills and fever.   Musculoskeletal:  Positive for back pain.       PAST MEDICAL HISTORY  There are no problems to display for this patient.      SURGICAL HISTORY  patient denies any surgical history    ALLERGIES  Allergies   Allergen Reactions    Penicillins Hives    Sulfa Drugs Hives       CURRENT MEDICATIONS  Home Medications       Reviewed by [facility] [employer] (Medical Assistant) on 06/24/24 at 1208  Med List Status: <None>     Medication Last Dose Status   cyclobenzaprine (FLEXERIL) 10 mg Tab PRN Active   predniSONE (DELTASONE) 20 MG Tab Not Taking Active   sertraline (ZOLOFT) 25 MG tablet Taking Active                    SOCIAL HISTORY  Social History     Tobacco Use    Smoking status: Every Day     Current packs/day: 1.00     Types: Cigarettes    Smokeless tobacco: Never   Substance and Sexual Activity    Alcohol use: No    Drug use: No    Sexual activity: Not on file       FAMILY HISTORY  No family history on file.      Medications, Allergies, and current problem list reviewed today in Epic.     Objective:     /72   Pulse 98   Temp 36.9 °C (98.5 °F) (Temporal)   Resp 16   Ht 1.854 m (6' 1\")   Wt 79.8 kg (176 lb)   SpO2 95%     Physical " Exam  Vitals reviewed.   Constitutional:       General: He is not in acute distress.     Appearance: Normal appearance. He is normal weight. He is not ill-appearing or toxic-appearing.   HENT:      Head: Normocephalic.      Right Ear: Tympanic membrane normal.      Left Ear: Tympanic membrane normal.      Nose: Nose normal.      Mouth/Throat:      Mouth: Mucous membranes are moist.      Pharynx: Oropharynx is clear. No oropharyngeal exudate or posterior oropharyngeal erythema.   Cardiovascular:      Rate and Rhythm: Normal rate and regular rhythm.      Pulses: Normal pulses.      Heart sounds: Normal heart sounds.   Pulmonary:      Effort: Pulmonary effort is normal. No respiratory distress.      Breath sounds: Normal breath sounds. No stridor. No wheezing, rhonchi or rales.   Musculoskeletal:        Arms:       Cervical back: Normal range of motion and neck supple. No rigidity.      Comments: Back: Full range of flexion, extension, rotation, lateralization.   Tenderness to palpation to bilateral lumbar paraspinal.  Negative midline tenderness, bony tenderness, crepitus, deformities, or step-offs.  Negative straight leg raise    Lymphadenopathy:      Cervical: No cervical adenopathy.   Skin:     General: Skin is warm.      Capillary Refill: Capillary refill takes less than 2 seconds.   Neurological:      General: No focal deficit present.      Mental Status: He is alert.   Psychiatric:         Mood and Affect: Mood normal.         Assessment/Plan:     Diagnosis and associated orders:     1. Lumbar pain        2. Muscle spasm of back           Comments/MDM:     Discussed with patient signs and symptoms consistent with a back strain.   Treatment of initial rest with no heavy lifting, stooping, or strenuous activity. Massage, ice and/or heat which ever feels better, and Ibuprofen per manufacture's instructions. Encouraged walking, stretching, and range of motion exercises as tolerated. Avoid sitting or laying down for  long periods of time except for at night during sleep.   Patient is overall very well-appearing, no acute distress, and normal vital signs. Suspicions for acute emergent pathology are low.   Red flag signs and symptoms discussed.  Instructed to return to ER or urgent care if symptoms worsen or fail to improve.          Differential diagnosis, natural history, supportive care, and indications for immediate follow-up discussed.    Advised the patient to follow-up with the primary care physician for recheck, reevaluation, and consideration of further management.    Please note that this dictation was created using voice recognition software. I have made a reasonable attempt to correct obvious errors, but I expect that there are errors of grammar and possibly content that I did not discover before finalizing the note.    This note was electronically signed by WILLIS Clement

## 2024-06-24 NOTE — LETTER
June 24, 2024    To Whom It May Concern:         This is confirmation that Kendell Coronadford attended his scheduled appointment with WILLIS Clement on 6/24/24. May return to work tomorrow on 6/25/24. Thank you for making accommodations for rest and recovery.          If you have any questions please do not hesitate to call me at the phone number listed below.    Sincerely,          KELLEY Clement.  [phone]

## 2024-08-28 ENCOUNTER — OFFICE VISIT (OUTPATIENT)
Dept: URGENT CARE | Facility: PHYSICIAN GROUP | Age: 34
End: 2024-08-28
Payer: COMMERCIAL

## 2024-08-28 VITALS
HEIGHT: 73 IN | SYSTOLIC BLOOD PRESSURE: 130 MMHG | WEIGHT: 174 LBS | TEMPERATURE: 98.3 F | BODY MASS INDEX: 23.06 KG/M2 | HEART RATE: 99 BPM | RESPIRATION RATE: 16 BRPM | OXYGEN SATURATION: 98 % | DIASTOLIC BLOOD PRESSURE: 80 MMHG

## 2024-08-28 DIAGNOSIS — U07.1 COVID: ICD-10-CM

## 2024-08-28 LAB
FLUAV RNA SPEC QL NAA+PROBE: NEGATIVE
FLUBV RNA SPEC QL NAA+PROBE: NEGATIVE
RSV RNA SPEC QL NAA+PROBE: NEGATIVE
SARS-COV-2 RNA RESP QL NAA+PROBE: POSITIVE

## 2024-08-28 PROCEDURE — 3075F SYST BP GE 130 - 139MM HG: CPT | Performed by: FAMILY MEDICINE

## 2024-08-28 PROCEDURE — 3079F DIAST BP 80-89 MM HG: CPT | Performed by: FAMILY MEDICINE

## 2024-08-28 PROCEDURE — 99214 OFFICE O/P EST MOD 30 MIN: CPT | Performed by: FAMILY MEDICINE

## 2024-08-28 PROCEDURE — 0241U POCT CEPHEID COV-2, FLU A/B, RSV - PCR: CPT | Performed by: FAMILY MEDICINE

## 2024-08-28 ASSESSMENT — ENCOUNTER SYMPTOMS
HEMOPTYSIS: 0
VOMITING: 0
SINUS PAIN: 0
MYALGIAS: 1
FEVER: 1
DIAPHORESIS: 1
DIARRHEA: 1
WHEEZING: 0
SHORTNESS OF BREATH: 0
NAUSEA: 0
COUGH: 1
CHILLS: 1
PALPITATIONS: 0
ABDOMINAL PAIN: 0
SORE THROAT: 0

## 2024-08-28 NOTE — LETTER
August 28, 2024         Patient: Kendell Medina   YOB: 1990   Date of Visit: 8/28/2024           To Whom it May Concern:    Kendell Medina was seen in my clinic on 8/28/2024.      Please excuse recent absence due to illness.       If you have any questions or concerns, please don't hesitate to call.        Sincerely,           Xavi Bloom M.D.  Electronically Signed

## 2024-08-28 NOTE — PROGRESS NOTES
"Subjective     Kendell Medina is a 34 y.o. male who presents with URI (Yesterday: Body aches, headaches, runny nose, loose stools, fatigue, chills)            34-year-old male presents with body aches, subjective fever, chills, fatigue, headache, rhinorrhea, cough, and diarrhea that began suddenly yesterday morning. He states he was at work doing fine one minute and then the next felt like he had been run over. Denies sore throat, SOB, wheezing, dysphagia, abdominal pain, nausea, vomiting. He needs a work note.        Review of Systems   Constitutional:  Positive for chills, diaphoresis, fever and malaise/fatigue.   HENT:  Positive for congestion. Negative for ear discharge, ear pain, sinus pain and sore throat.    Respiratory:  Positive for cough. Negative for hemoptysis, shortness of breath and wheezing.    Cardiovascular:  Negative for chest pain and palpitations.   Gastrointestinal:  Positive for diarrhea. Negative for abdominal pain, nausea and vomiting.   Musculoskeletal:  Positive for myalgias.   Skin:  Negative for rash.              Objective     /80   Pulse 99   Temp 36.8 °C (98.3 °F)   Resp 16   Ht 1.854 m (6' 1\")   Wt 78.9 kg (174 lb)   SpO2 98%   BMI 22.96 kg/m²      Physical Exam  Constitutional:       Appearance: Normal appearance.   HENT:      Head: Normocephalic and atraumatic.      Comments: Left TM is clear. Mildly erythematous right TM. No effusions or bulging. Landmarks visible. Light reflex present.      Right Ear: Ear canal normal.      Left Ear: Ear canal normal.      Mouth/Throat:      Mouth: Mucous membranes are moist.      Pharynx: Posterior oropharyngeal erythema and postnasal drip present. No oropharyngeal exudate.      Tonsils: No tonsillar exudate.   Cardiovascular:      Rate and Rhythm: Normal rate and regular rhythm.   Pulmonary:      Effort: Pulmonary effort is normal.      Breath sounds: Normal breath sounds.   Musculoskeletal:      Cervical back: Neck supple. "   Lymphadenopathy:      Cervical: No cervical adenopathy.   Neurological:      General: No focal deficit present.      Mental Status: He is alert.   Psychiatric:         Mood and Affect: Mood normal.         Behavior: Behavior normal.                   Assessment & Plan   34-year-old otherwise healthy male presents to  for evaluation of sudden onset body aches, fevers, chills, and headaches that started yesterday morning. Patient is non-toxic appearing and his vitals are stable.      PCR positive for COVID    Rx paxlovid      Differential diagnosis, natural history, supportive care, and indications for immediate follow-up discussed. All questions answered. Patient agrees with the plan of care.     Follow-up as needed if symptoms worsen or fail to improve to PCP, Urgent care or Emergency Room.     I have personally reviewed prior external notes and test results pertinent to today's visit.  I have independently reviewed and interpreted all diagnostics ordered during this urgent care acute visit.        Assessment & Plan

## 2025-03-31 ENCOUNTER — OFFICE VISIT (OUTPATIENT)
Dept: URGENT CARE | Facility: PHYSICIAN GROUP | Age: 35
End: 2025-03-31
Payer: COMMERCIAL

## 2025-03-31 VITALS
OXYGEN SATURATION: 98 % | BODY MASS INDEX: 23.99 KG/M2 | WEIGHT: 181 LBS | HEART RATE: 70 BPM | DIASTOLIC BLOOD PRESSURE: 72 MMHG | TEMPERATURE: 97.2 F | HEIGHT: 73 IN | RESPIRATION RATE: 14 BRPM | SYSTOLIC BLOOD PRESSURE: 128 MMHG

## 2025-03-31 DIAGNOSIS — S39.012D ACUTE MYOFASCIAL STRAIN OF LUMBAR REGION, SUBSEQUENT ENCOUNTER: ICD-10-CM

## 2025-03-31 NOTE — PROGRESS NOTES
"    Back Pain        He has hx chronic low back pain from workplace accident several years ago.           C/o worsening back pain x 3 d.       Denies acute injury.      The problem occurs constantly. The problem is unchanged. The pain is present in the lumbar spine. The quality of the pain is described as aching. The pain does not radiate. The pain is moderate. The symptoms are aggravated by position. Pertinent negatives include no bladder incontinence, bowel incontinence, dysuria, fever, headaches, numbness or weakness. Treatments tried: motrin.  The treatment provided no relief.     Social History     Tobacco Use    Smoking status: Every Day     Current packs/day: 1.00     Types: Cigarettes    Smokeless tobacco: Never   Substance Use Topics    Alcohol use: No    Drug use: No       Family hx was reviewed - no pertinent past family hx      Past medical history was unremarkable and not pertinent to current issue    Review of Systems   Constitutional: Negative for fever.   Gastrointestinal: Negative for bowel incontinence.   Genitourinary: Negative for bladder incontinence, dysuria, urgency and frequency.   Musculoskeletal: Positive for back pain.   Neurological: Negative for weakness, numbness and headaches.   All other systems reviewed and are negative.         Objective:     /72   Pulse 70   Temp 36.2 °C (97.2 °F) (Temporal)   Resp 14   Ht 1.854 m (6' 1\")   Wt 82.1 kg (181 lb)   SpO2 98%     Physical Exam   Constitutional: pt is oriented to person, place, and time. Pt appears well-developed and well-nourished. No distress.   HENT:   Head: Normocephalic and atraumatic.   Eyes: EOM are normal. Pupils are equal, round, and reactive to light. No scleral icterus.   Neck: Normal range of motion. Neck supple. No thyromegaly present.   Cardiovascular: Normal rate, regular rhythm and normal heart sounds.    Pulmonary/Chest: Effort normal and breath sounds normal. No respiratory distress.  no wheezes.   no rales. "  no tenderness.   Musculoskeletal:                Lumbar spine: pt exhibits decreased range of motion, spasm and tenderness . Pt exhibits no bony tenderness, no swelling, no edema, no deformity  .   Neurological: patient is alert and oriented to person, place, and time. Patient has normal reflexes. No cranial nerve deficit. Patient exhibits normal muscle tone.      STRAIGHT LEG RAISE, GUANACO NEGATIVE BILAT  Skin: Skin is warm and dry. No rash noted. No erythema.   Psychiatric: patient has a normal mood and affect.  behavior is normal.   Nursing note and vitals reviewed.              Assessment/Plan:          1. Acute myofascial strain of lumbar region, subsequent encounter    rx motrin 800mg tid prn      Differential diagnosis, natural history, supportive care, and indications for immediate follow-up discussed. All questions answered. Patient agrees with the plan of care.     Follow-up as needed if symptoms worsen or fail to improve to PCP, Urgent care or Emergency Room.     I have personally reviewed prior external notes and test results pertinent to today's visit.  I have independently reviewed and interpreted all diagnostics ordered during this urgent care acute visit.

## 2025-03-31 NOTE — LETTER
March 31, 2025         Patient: Kendell Medina   YOB: 1990   Date of Visit: 3/31/2025           To Whom it May Concern:    Kendell Medina was seen in my clinic on 3/31/2025.     Please excuse absence due to illness for 3/30, 31, 4/1.    If you have any questions or concerns, please don't hesitate to call.        Sincerely,           Xavi Bloom M.D.  Electronically Signed

## 2025-06-13 ENCOUNTER — HOSPITAL ENCOUNTER (EMERGENCY)
Facility: MEDICAL CENTER | Age: 35
End: 2025-06-14
Attending: EMERGENCY MEDICINE
Payer: COMMERCIAL

## 2025-06-13 ENCOUNTER — APPOINTMENT (OUTPATIENT)
Dept: RADIOLOGY | Facility: MEDICAL CENTER | Age: 35
End: 2025-06-13
Attending: EMERGENCY MEDICINE
Payer: COMMERCIAL

## 2025-06-13 DIAGNOSIS — S06.0XAA CONCUSSION WITH UNKNOWN LOSS OF CONSCIOUSNESS STATUS, INITIAL ENCOUNTER: Primary | ICD-10-CM

## 2025-06-13 DIAGNOSIS — S01.01XA LACERATION OF SCALP, INITIAL ENCOUNTER: ICD-10-CM
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
## 2025-06-13 PROCEDURE — 304217 HCHG IRRIGATION SYSTEM

## 2025-06-13 PROCEDURE — 304999 HCHG REPAIR-SIMPLE/INTERMED LEVEL 1

## 2025-06-13 PROCEDURE — 99284 EMERGENCY DEPT VISIT MOD MDM: CPT

## 2025-06-13 PROCEDURE — 305308 HCHG STAPLER,SKIN,DISP.

## 2025-06-14 VITALS
SYSTOLIC BLOOD PRESSURE: 120 MMHG | HEIGHT: 73 IN | TEMPERATURE: 98 F | RESPIRATION RATE: 16 BRPM | BODY MASS INDEX: 23.19 KG/M2 | HEART RATE: 78 BPM | WEIGHT: 175 LBS | OXYGEN SATURATION: 98 % | DIASTOLIC BLOOD PRESSURE: 72 MMHG

## 2025-06-14 PROCEDURE — 700102 HCHG RX REV CODE 250 W/ 637 OVERRIDE(OP): Performed by: EMERGENCY MEDICINE

## 2025-06-14 PROCEDURE — 70450 CT HEAD/BRAIN W/O DYE: CPT

## 2025-06-14 PROCEDURE — A9270 NON-COVERED ITEM OR SERVICE: HCPCS | Performed by: EMERGENCY MEDICINE

## 2025-06-14 RX ORDER — ACETAMINOPHEN 500 MG
1000 TABLET ORAL ONCE
Status: COMPLETED | OUTPATIENT
Start: 2025-06-14 | End: 2025-06-14

## 2025-06-14 RX ORDER — NICOTINE 21 MG/24HR
1 PATCH, TRANSDERMAL 24 HOURS TRANSDERMAL ONCE
Status: DISCONTINUED | OUTPATIENT
Start: 2025-06-14 | End: 2025-06-14 | Stop reason: HOSPADM

## 2025-06-14 RX ADMIN — ACETAMINOPHEN 1000 MG: 500 TABLET ORAL at 00:17

## 2025-06-14 RX ADMIN — NICOTINE 14 MG: 14 PATCH TRANSDERMAL at 00:27

## 2025-06-14 ASSESSMENT — PAIN DESCRIPTION - PAIN TYPE: TYPE: ACUTE PAIN

## 2025-06-14 NOTE — ED NOTES
RN irrigated with 1L of NS on pt's laceration to the left side of his head, ERP placed two staples.

## 2025-06-14 NOTE — ED NOTES
"Pt discharged home with SO. Pt is more alert and orientated. Patient in possession of belongings. Pt provided discharge education and information pertaining to medications and follow up appointments. Pt received copy of discharge instructions and verbalized understanding. Discussed signs and symptoms to return to the ER, patient verbalized understanding. Discussed with pt about follow up at PCP to remove staples in 7-10 days, pt verbalized understanding. Pt ambulatory with a steady gait to the lobby.      /72   Pulse 78   Temp 36.7 °C (98 °F) (Temporal)   Resp 16   Ht 1.854 m (6' 1\")   Wt 79.4 kg (175 lb)   SpO2 98%   BMI 23.09 kg/m²    "

## 2025-06-14 NOTE — DISCHARGE INSTRUCTIONS
You should follow-up with your primary care provider in the next 1 to 2 weeks to ensure that your postconcussive symptoms are improving.  It is normal to have confusion after head injury.  The CT scan of your head did not show any skull fracture or traumatic brain bleed.  You have 2 staples in your scalp that will need to be removed in 7 to 10 days.  This can be done at your primary care doctor's office.

## 2025-06-14 NOTE — ED TRIAGE NOTES
"Chief Complaint   Patient presents with    ALOC    GLF     35 y.o. male to EMS for above complaint. Pt coming from home, pt called a friend reporting he fell, pt's friend Tri called 911 for pt. Pt answered the door for EMS. Pt is altered with repetitive question does not remember falling, pt has a laceration to the L side of his head, bleeding controlled. Unsure of LOC, pt is A/O x 2, disoriented to time and event. GCS 14 Per [employer] pt possibly fell at 2120.     No PIV placed, no c-collar, pt's dog with neighbor Samuel.     Ht 1.854 m (6' 1\")   Wt 79.4 kg (175 lb)   BMI 23.09 kg/m²     "

## 2025-06-14 NOTE — ED PROVIDER NOTES
"ED Provider Note    CHIEF COMPLAINT  Chief Complaint   Patient presents with    ALOC    GLF       EXTERNAL RECORDS REVIEWED  Other no recent ED visits or hospitalizations to review.    HPI/ROS  LIMITATION TO HISTORY   Select: Altered mental status / Confusion  OUTSIDE HISTORIAN(S):  EMS provides collateral history as outlined below.    Kendell Medina is a 35 y.o. male who presents to the emergency department for evaluation after head injury.  Patient's friend told EMS he fell.  When EMS arrived he was confused, GCS 14, with repetitive questioning.  He has a laceration on the left parietal scalp.  Unclear if tetanus is up-to-date.  He does not take any blood thinning medications.  Tetanus up-to-date.  No neck pain.    PAST MEDICAL HISTORY   No past medical history.    SURGICAL HISTORY  patient denies any surgical history    FAMILY HISTORY  History reviewed. No pertinent family history.    SOCIAL HISTORY  Social History     Tobacco Use    Smoking status: Every Day     Current packs/day: 1.00     Types: Cigarettes    Smokeless tobacco: Never   Vaping Use    Vaping status: Never Used   Substance and Sexual Activity    Alcohol use: No    Drug use: No    Sexual activity: Not on file       CURRENT MEDICATIONS  Home Medications    **Home medications have not yet been reviewed for this encounter**         ALLERGIES  Allergies[1]    PHYSICAL EXAM  VITAL SIGNS: /72   Pulse 78   Temp 36.7 °C (98 °F) (Temporal)   Resp 16   Ht 1.854 m (6' 1\")   Wt 79.4 kg (175 lb)   SpO2 98%   BMI 23.09 kg/m²    General: Well-appearing, no acute distress.  Confused, inappropriately laughing.  Repetitive questioning.  Eyes: No scleral icterus. EOM grossly intact BL.  Pupils equal and reactive.  HENT: ~2cm scalp laceration present over the left parietal scalp.  Neck: Normal ROM. No midline cervical spine tenderness.   Lungs: Non-labored breathing.   Cardiac: Regular rate and rhythm.   Abdomen: Soft, non-tender, non-distended. No " rebound or guarding.   MSK: No joint swelling or erythema. Symmetric movement of all extremities.  Skin: No rashes, lesions, bruising, or petechiae. Well-perfused.   Neuro: Grossly nonfocal neurologic exam. Face symmetric. GCS 14.     EKG/LABS  None    RADIOLOGY/PROCEDURES   I have independently interpreted the diagnostic imaging associated with this visit and am waiting the final reading from the radiologist.     My preliminary interpretation is as follows:   CT head: No hemorrhage or skull fracture.    Radiologist interpretation:  CT-HEAD W/O   Final Result         1.  No acute intracranial abnormality.   2.  Bilateral maxillary sinusitis changes                 COURSE & MEDICAL DECISION MAKING    ASSESSMENT, COURSE AND PLAN  Care Narrative:   Kendell Medina is a 35 y.o. male who presents to the emergency department for evaluation after head injury.  Patient's friend told EMS he fell.  When EMS arrived he was confused, GCS 14, with repetitive questioning.     2313: On my initial assessment, ABCs are intact.  Vital signs are within normal limits.  He is awake, alert, GCS 14.  He is confused, asking questions repetitively, inappropriately laughing.  Neurologic exam is nonfocal.  He has a small 2 cm laceration present over his left parietal scalp, which is hemostatic.  Tetanus is up-to-date.  No facial trauma.  No midline cervical spine tenderness.    CT imaging ordered to evaluate for traumatic skull fracture and hemorrhage.  He received Tylenol and nicotine patch.    0100: CT head showed no skull fracture or traumatic hemorrhage.    0130: I reviewed the results with the patient.  I talked with his partner Tri on the phone, and she will come to the emergency department to pick him up.  I recommended staple removal in 7 to 10 days.  Return precautions were reviewed, all of his questions were answered, and he was discharged in stable condition.    ADDITIONAL PROBLEMS MANAGED    No att. providers found spent  greater than 3 minutes with the patient explaining the importance of smoking cessation.     DISPOSITION AND DISCUSSIONS  I have discussed management of the patient with the following physicians and ROGELIO's:  None    Discussion of management with other QHP or appropriate source(s): None     Escalation of care considered, and ultimately not performed:Laboratory analysis and acute inpatient care management, however at this time, the patient is most appropriate for outpatient management    Barriers to care at this time, including but not limited to: Patient does not have established PCP.     Decision tools and prescription drugs considered including, but not limited to: OTC Tylenol and ibuprofen.    FINAL DIAGNOSIS  1. Concussion with unknown loss of consciousness status, initial encounter Acute   2. Fall, initial encounter Acute   3. Laceration of scalp, initial encounter Acute        Electronically signed by: Monae Smith D.O., 6/13/2025 11:13 PM           [1]   Allergies  Allergen Reactions    Penicillins Hives    Sulfa Drugs Hives